# Patient Record
Sex: FEMALE | Race: WHITE | NOT HISPANIC OR LATINO | Employment: PART TIME | URBAN - METROPOLITAN AREA
[De-identification: names, ages, dates, MRNs, and addresses within clinical notes are randomized per-mention and may not be internally consistent; named-entity substitution may affect disease eponyms.]

---

## 2018-03-05 ENCOUNTER — OFFICE VISIT (OUTPATIENT)
Dept: URGENT CARE | Facility: PHYSICIAN GROUP | Age: 58
End: 2018-03-05

## 2018-03-05 VITALS
DIASTOLIC BLOOD PRESSURE: 74 MMHG | OXYGEN SATURATION: 97 % | BODY MASS INDEX: 45.99 KG/M2 | WEIGHT: 293 LBS | HEIGHT: 67 IN | SYSTOLIC BLOOD PRESSURE: 116 MMHG | TEMPERATURE: 98.4 F | HEART RATE: 98 BPM

## 2018-03-05 DIAGNOSIS — B37.9 ANTIBIOTIC-INDUCED YEAST INFECTION: ICD-10-CM

## 2018-03-05 DIAGNOSIS — T36.95XA ANTIBIOTIC-INDUCED YEAST INFECTION: ICD-10-CM

## 2018-03-05 DIAGNOSIS — J01.40 ACUTE PANSINUSITIS, RECURRENCE NOT SPECIFIED: ICD-10-CM

## 2018-03-05 PROCEDURE — 99204 OFFICE O/P NEW MOD 45 MIN: CPT | Performed by: NURSE PRACTITIONER

## 2018-03-05 RX ORDER — CEFDINIR 300 MG/1
300 CAPSULE ORAL 2 TIMES DAILY
Qty: 20 CAP | Refills: 0 | Status: SHIPPED | OUTPATIENT
Start: 2018-03-05 | End: 2020-10-20

## 2018-03-05 RX ORDER — FLUCONAZOLE 150 MG/1
150 TABLET ORAL ONCE
Qty: 2 TAB | Refills: 0 | Status: SHIPPED | OUTPATIENT
Start: 2018-03-05 | End: 2018-03-05

## 2018-03-06 NOTE — PROGRESS NOTES
Subjective:      Nikki Shaffer is a 57 y.o. female who presents with Sinus Problem (congestion and pain, green mucus )            HPI  Left side sinus facial pressure x 10 days, had cough and sore throat but has improved. Denies fever. Nasal congestion with green nasal d/c. Nausea d/t swallowing mucus. No SOB, chest tightness, wheeze. Mucinex. Saline nasal flush 2x/day. Flonase.     PMH:  has a past medical history of Allergic rhinitis; Depression; and Reflux.  MEDS:   Current Outpatient Prescriptions:   •  Oxycodone-Acetaminophen (PERCOCET)  MG TABS, Take 1 Tab by mouth every 8 hours as needed for Severe Pain. Fill after 01/01/2012, Disp: 60 Each, Rfl: 0  •  duloxetine (CYMBALTA) 60 MG CPEP, Take 1 Cap by mouth 2 times a day., Disp: 180 Cap, Rfl: 3  •  cyclobenzaprine (FLEXERIL) 10 MG TABS, Take 1 Tab by mouth 3 times a day as needed for Mild Pain., Disp: 90 Each, Rfl: 2  •  pramipexole (MIRAPEX) 1 MG TABS, Take 1 Tab by mouth 3 times a day., Disp: 270 Each, Rfl: 3  •  trazodone (DESYREL) 100 MG TABS, Take 1.5 Tabs by mouth 2 times a day., Disp: 180 Each, Rfl: 3  •  metformin SR (GLUCOPHAGE XR) 500 MG TB24, Take 1 Tab by mouth every day., Disp: 90 Each, Rfl: 3  •  Magnesium 100 MG CAPS, Take 400 mg by mouth every day., Disp: , Rfl:   •  simvastatin (ZOCOR) 20 MG TABS, Take 1 Tab by mouth every evening., Disp: 90 Each, Rfl: 11  •  omeprazole (PRILOSEC OTC) 20 MG tablet, Take 1 Tab by mouth every day., Disp: 90 Tab, Rfl: 3  •  vitamin D (CHOLECALCIFEROL) 1000 UNIT TABS, Take 1,000 Units by mouth every day., Disp: , Rfl:   •  Ibuprofen (ADVIL) 200 MG CAPS, Take 800 mg by mouth every day., Disp: , Rfl:   ALLERGIES:   Allergies   Allergen Reactions   • Codeine Swelling   • Vicodin [Hydrocodone-Acetaminophen] Vomiting     SURGHX:   Past Surgical History:   Procedure Laterality Date   • APPENDECTOMY       SOCHX:  reports that she has been smoking.  She has never used smokeless tobacco. She reports that she drinks  "alcohol. She reports that she does not use drugs.  FH: Family history was reviewed, no pertinent findings to report    Review of Systems   Constitutional: Negative for chills, fever and malaise/fatigue.   HENT: Positive for congestion. Negative for ear pain, sinus pain and sore throat.    Eyes: Negative for discharge and redness.   Respiratory: Negative for cough, sputum production, shortness of breath and wheezing.    Gastrointestinal: Positive for nausea. Negative for abdominal pain, constipation, diarrhea and vomiting.   Musculoskeletal: Negative for myalgias.   Neurological: Negative for dizziness, weakness and headaches.   All other systems reviewed and are negative.         Objective:     /74   Pulse 98   Temp 36.9 °C (98.4 °F)   Ht 1.702 m (5' 7\")   Wt (!) 147.9 kg (326 lb)   LMP 02/09/2010   SpO2 97%   BMI 51.06 kg/m²      Physical Exam   Constitutional: She is oriented to person, place, and time. Vital signs are normal. She appears well-developed and well-nourished. She is active and cooperative.  Non-toxic appearance. She does not have a sickly appearance. She appears ill. No distress.   HENT:   Head: Normocephalic.   Right Ear: External ear and ear canal normal. Tympanic membrane is injected.   Left Ear: External ear and ear canal normal. Tympanic membrane is injected.   Nose: Mucosal edema, rhinorrhea and sinus tenderness present. Right sinus exhibits maxillary sinus tenderness and frontal sinus tenderness. Left sinus exhibits maxillary sinus tenderness and frontal sinus tenderness.   Mouth/Throat: Uvula is midline. Mucous membranes are dry. No uvula swelling. No posterior oropharyngeal edema or posterior oropharyngeal erythema.   Eyes: Conjunctivae and EOM are normal. Pupils are equal, round, and reactive to light.   Neck: Normal range of motion. Neck supple.   Cardiovascular: Normal rate and regular rhythm.    Pulmonary/Chest: Effort normal and breath sounds normal. No accessory muscle " usage. No respiratory distress. She has no wheezes. She has no rales.   Musculoskeletal: Normal range of motion.   Lymphadenopathy:     She has no cervical adenopathy.   Neurological: She is alert and oriented to person, place, and time.   Skin: Skin is warm and dry. She is not diaphoretic.   Vitals reviewed.              Assessment/Plan:     1. Acute pansinusitis, recurrence not specified    - cefdinir (OMNICEF) 300 MG Cap; Take 1 Cap by mouth 2 times a day.  Dispense: 20 Cap; Refill: 0    2. Antibiotic-induced yeast infection  - fluconazole (DIFLUCAN) 150 MG tablet; Take 1 Tab by mouth Once for 1 dose. May repeat in 3 days if no improvement  Dispense: 2 Tab; Refill: 0    Increase water intake  Get rest  May use Ibuprofen/Tylenol prn for any fever, body aches or throat pain  May use saline nasal spray for nasal congestion  May use Flonase for allergen nasal congestion  May gargle with salt water prn for throat discomfort  May drink smoothies for nutrition if too painful to swallow solid foods  Monitor for productive cough, SOB and chest pain/tightness- need re-evaluation

## 2018-03-07 ASSESSMENT — ENCOUNTER SYMPTOMS
CONSTIPATION: 0
COUGH: 0
WEAKNESS: 0
VOMITING: 0
DIZZINESS: 0
WHEEZING: 0
DIARRHEA: 0
ABDOMINAL PAIN: 0
MYALGIAS: 0
EYE DISCHARGE: 0
SORE THROAT: 0
SHORTNESS OF BREATH: 0
CHILLS: 0
SINUS PAIN: 0
FEVER: 0
SPUTUM PRODUCTION: 0
NAUSEA: 1
EYE REDNESS: 0
HEADACHES: 0

## 2020-09-02 ENCOUNTER — HOSPITAL ENCOUNTER (EMERGENCY)
Dept: HOSPITAL 8 - ED | Age: 60
Discharge: HOME | End: 2020-09-02
Payer: COMMERCIAL

## 2020-09-02 VITALS — SYSTOLIC BLOOD PRESSURE: 128 MMHG | DIASTOLIC BLOOD PRESSURE: 77 MMHG

## 2020-09-02 VITALS — WEIGHT: 293 LBS | BODY MASS INDEX: 44.41 KG/M2 | HEIGHT: 68 IN

## 2020-09-02 DIAGNOSIS — M79.10: ICD-10-CM

## 2020-09-02 DIAGNOSIS — G89.29: Primary | ICD-10-CM

## 2020-09-02 DIAGNOSIS — M54.32: ICD-10-CM

## 2020-09-02 DIAGNOSIS — M54.5: ICD-10-CM

## 2020-09-02 DIAGNOSIS — M51.36: ICD-10-CM

## 2020-09-02 DIAGNOSIS — Z79.899: ICD-10-CM

## 2020-09-02 PROCEDURE — 96375 TX/PRO/DX INJ NEW DRUG ADDON: CPT

## 2020-09-02 PROCEDURE — 96374 THER/PROPH/DIAG INJ IV PUSH: CPT

## 2020-09-02 PROCEDURE — 99284 EMERGENCY DEPT VISIT MOD MDM: CPT

## 2020-09-02 NOTE — NUR
PIV PLACED TO RT BREAST, FLUSHES EASILY WITH GOOD BLOOD RETURN. PT TOLERATED 
WELL. PT MEDICATED PER EMAR, TOLERATED WELL. BP  AND SPO2 MONITORS IN PLACE. PT 
REPORTS PAIN DECREASED FROM 8/10 TO 6/10. EDPA [facility] AT BEDSIDE AT THIS TIME, PT 
CONVERSING WITH EDPA REGARDING POC.

## 2020-10-20 ENCOUNTER — HOSPITAL ENCOUNTER (OUTPATIENT)
Facility: MEDICAL CENTER | Age: 60
End: 2020-10-22
Attending: EMERGENCY MEDICINE | Admitting: INTERNAL MEDICINE
Payer: COMMERCIAL

## 2020-10-20 DIAGNOSIS — M54.50 ACUTE EXACERBATION OF CHRONIC LOW BACK PAIN: ICD-10-CM

## 2020-10-20 DIAGNOSIS — M54.59 INTRACTABLE LOW BACK PAIN: ICD-10-CM

## 2020-10-20 DIAGNOSIS — G89.29 ACUTE EXACERBATION OF CHRONIC LOW BACK PAIN: ICD-10-CM

## 2020-10-20 DIAGNOSIS — R26.2 UNABLE TO AMBULATE: ICD-10-CM

## 2020-10-20 DIAGNOSIS — M54.17 LUMBOSACRAL RADICULOPATHY: ICD-10-CM

## 2020-10-20 PROBLEM — Z72.0 TOBACCO ABUSE: Status: ACTIVE | Noted: 2020-10-20

## 2020-10-20 PROBLEM — E11.9 DM (DIABETES MELLITUS) (HCC): Status: ACTIVE | Noted: 2020-10-20

## 2020-10-20 LAB — GLUCOSE BLD-MCNC: 132 MG/DL (ref 65–99)

## 2020-10-20 PROCEDURE — 96375 TX/PRO/DX INJ NEW DRUG ADDON: CPT

## 2020-10-20 PROCEDURE — 96372 THER/PROPH/DIAG INJ SC/IM: CPT | Mod: XU

## 2020-10-20 PROCEDURE — 51798 US URINE CAPACITY MEASURE: CPT

## 2020-10-20 PROCEDURE — 99285 EMERGENCY DEPT VISIT HI MDM: CPT

## 2020-10-20 PROCEDURE — A9270 NON-COVERED ITEM OR SERVICE: HCPCS | Performed by: STUDENT IN AN ORGANIZED HEALTH CARE EDUCATION/TRAINING PROGRAM

## 2020-10-20 PROCEDURE — 96374 THER/PROPH/DIAG INJ IV PUSH: CPT

## 2020-10-20 PROCEDURE — 700111 HCHG RX REV CODE 636 W/ 250 OVERRIDE (IP): Performed by: STUDENT IN AN ORGANIZED HEALTH CARE EDUCATION/TRAINING PROGRAM

## 2020-10-20 PROCEDURE — 700102 HCHG RX REV CODE 250 W/ 637 OVERRIDE(OP): Performed by: STUDENT IN AN ORGANIZED HEALTH CARE EDUCATION/TRAINING PROGRAM

## 2020-10-20 PROCEDURE — G0378 HOSPITAL OBSERVATION PER HR: HCPCS

## 2020-10-20 PROCEDURE — 82962 GLUCOSE BLOOD TEST: CPT

## 2020-10-20 PROCEDURE — 700111 HCHG RX REV CODE 636 W/ 250 OVERRIDE (IP): Performed by: EMERGENCY MEDICINE

## 2020-10-20 PROCEDURE — 99220 PR INITIAL OBSERVATION CARE,LEVL III: CPT | Mod: GC | Performed by: INTERNAL MEDICINE

## 2020-10-20 RX ORDER — INSULIN GLARGINE 100 [IU]/ML
0.2 INJECTION, SOLUTION SUBCUTANEOUS EVERY EVENING
Status: DISCONTINUED | OUTPATIENT
Start: 2020-10-20 | End: 2020-10-20

## 2020-10-20 RX ORDER — SIMVASTATIN 20 MG
20 TABLET ORAL EVERY EVENING
Status: DISCONTINUED | OUTPATIENT
Start: 2020-10-20 | End: 2020-10-22 | Stop reason: HOSPADM

## 2020-10-20 RX ORDER — MORPHINE SULFATE 10 MG/ML
10 INJECTION, SOLUTION INTRAMUSCULAR; INTRAVENOUS ONCE
Status: DISCONTINUED | OUTPATIENT
Start: 2020-10-20 | End: 2020-10-21

## 2020-10-20 RX ORDER — OXYCODONE HYDROCHLORIDE AND ACETAMINOPHEN 5; 325 MG/1; MG/1
1 TABLET ORAL EVERY 4 HOURS PRN
COMMUNITY

## 2020-10-20 RX ORDER — LORAZEPAM 0.5 MG/1
1 TABLET ORAL ONCE
Status: DISCONTINUED | OUTPATIENT
Start: 2020-10-20 | End: 2020-10-21

## 2020-10-20 RX ORDER — AMOXICILLIN 250 MG
2 CAPSULE ORAL 2 TIMES DAILY
Status: DISCONTINUED | OUTPATIENT
Start: 2020-10-20 | End: 2020-10-22 | Stop reason: HOSPADM

## 2020-10-20 RX ORDER — DEXTROSE MONOHYDRATE 25 G/50ML
50 INJECTION, SOLUTION INTRAVENOUS
Status: DISCONTINUED | OUTPATIENT
Start: 2020-10-20 | End: 2020-10-22

## 2020-10-20 RX ORDER — KETOROLAC TROMETHAMINE 30 MG/ML
30 INJECTION, SOLUTION INTRAMUSCULAR; INTRAVENOUS ONCE
Status: COMPLETED | OUTPATIENT
Start: 2020-10-20 | End: 2020-10-20

## 2020-10-20 RX ORDER — POLYETHYLENE GLYCOL 3350 17 G/17G
1 POWDER, FOR SOLUTION ORAL
Status: DISCONTINUED | OUTPATIENT
Start: 2020-10-20 | End: 2020-10-22 | Stop reason: HOSPADM

## 2020-10-20 RX ORDER — NICOTINE 21 MG/24HR
14 PATCH, TRANSDERMAL 24 HOURS TRANSDERMAL
Status: DISCONTINUED | OUTPATIENT
Start: 2020-10-20 | End: 2020-10-22 | Stop reason: HOSPADM

## 2020-10-20 RX ORDER — BISACODYL 10 MG
10 SUPPOSITORY, RECTAL RECTAL
Status: DISCONTINUED | OUTPATIENT
Start: 2020-10-20 | End: 2020-10-22 | Stop reason: HOSPADM

## 2020-10-20 RX ORDER — TIZANIDINE 4 MG/1
4 TABLET ORAL EVERY 6 HOURS
COMMUNITY

## 2020-10-20 RX ORDER — TIZANIDINE 4 MG/1
4 TABLET ORAL EVERY 6 HOURS
Status: DISCONTINUED | OUTPATIENT
Start: 2020-10-20 | End: 2020-10-22 | Stop reason: HOSPADM

## 2020-10-20 RX ORDER — DEXAMETHASONE SODIUM PHOSPHATE 4 MG/ML
12 INJECTION, SOLUTION INTRA-ARTICULAR; INTRALESIONAL; INTRAMUSCULAR; INTRAVENOUS; SOFT TISSUE ONCE
Status: COMPLETED | OUTPATIENT
Start: 2020-10-20 | End: 2020-10-20

## 2020-10-20 RX ORDER — OXYCODONE HYDROCHLORIDE AND ACETAMINOPHEN 5; 325 MG/1; MG/1
1 TABLET ORAL EVERY 4 HOURS PRN
Status: DISCONTINUED | OUTPATIENT
Start: 2020-10-20 | End: 2020-10-22 | Stop reason: HOSPADM

## 2020-10-20 RX ORDER — DIAZEPAM 5 MG/ML
5 INJECTION, SOLUTION INTRAMUSCULAR; INTRAVENOUS ONCE
Status: COMPLETED | OUTPATIENT
Start: 2020-10-20 | End: 2020-10-20

## 2020-10-20 RX ORDER — HEPARIN SODIUM 5000 [USP'U]/ML
5000 INJECTION, SOLUTION INTRAVENOUS; SUBCUTANEOUS EVERY 8 HOURS
Status: DISCONTINUED | OUTPATIENT
Start: 2020-10-20 | End: 2020-10-22 | Stop reason: HOSPADM

## 2020-10-20 RX ADMIN — DIAZEPAM 5 MG: 5 INJECTION, SOLUTION INTRAMUSCULAR; INTRAVENOUS at 12:06

## 2020-10-20 RX ADMIN — KETOROLAC TROMETHAMINE 30 MG: 30 INJECTION, SOLUTION INTRAMUSCULAR at 11:55

## 2020-10-20 RX ADMIN — OXYCODONE HYDROCHLORIDE AND ACETAMINOPHEN 1 TABLET: 5; 325 TABLET ORAL at 22:28

## 2020-10-20 RX ADMIN — HEPARIN SODIUM 5000 UNITS: 5000 INJECTION, SOLUTION INTRAVENOUS; SUBCUTANEOUS at 22:28

## 2020-10-20 RX ADMIN — DEXAMETHASONE SODIUM PHOSPHATE 12 MG: 4 INJECTION, SOLUTION INTRA-ARTICULAR; INTRALESIONAL; INTRAMUSCULAR; INTRAVENOUS; SOFT TISSUE at 11:55

## 2020-10-20 ASSESSMENT — ENCOUNTER SYMPTOMS
CONSTIPATION: 0
SORE THROAT: 0
FALLS: 0
WHEEZING: 0
BLURRED VISION: 0
WEAKNESS: 1
LOSS OF CONSCIOUSNESS: 0
HEADACHES: 0
DEPRESSION: 0
SPEECH CHANGE: 0
VOMITING: 0
MYALGIAS: 0
FEVER: 0
NAUSEA: 0
HEMOPTYSIS: 0
SHORTNESS OF BREATH: 0
CLAUDICATION: 0
ABDOMINAL PAIN: 0
DIARRHEA: 0
PALPITATIONS: 0
FOCAL WEAKNESS: 0
TINGLING: 1
SENSORY CHANGE: 0
COUGH: 0
TREMORS: 0
SEIZURES: 0
WEIGHT LOSS: 0
DOUBLE VISION: 0
BACK PAIN: 1
CHILLS: 0
DIZZINESS: 0
NECK PAIN: 0

## 2020-10-20 ASSESSMENT — LIFESTYLE VARIABLES
TOTAL SCORE: 0
CONSUMPTION TOTAL: NEGATIVE
HAVE YOU EVER FELT YOU SHOULD CUT DOWN ON YOUR DRINKING: NO
TOTAL SCORE: 0
HAVE PEOPLE ANNOYED YOU BY CRITICIZING YOUR DRINKING: NO
DO YOU DRINK ALCOHOL: NO
SUBSTANCE_ABUSE: 0
AVERAGE NUMBER OF DAYS PER WEEK YOU HAVE A DRINK CONTAINING ALCOHOL: 0
EVER HAD A DRINK FIRST THING IN THE MORNING TO STEADY YOUR NERVES TO GET RID OF A HANGOVER: NO
DOES PATIENT WANT TO STOP DRINKING: NO
ALCOHOL_USE: YES
ON A TYPICAL DAY WHEN YOU DRINK ALCOHOL HOW MANY DRINKS DO YOU HAVE: 0
DOES PATIENT WANT TO STOP DRINKING: NO
EVER FELT BAD OR GUILTY ABOUT YOUR DRINKING: NO
HOW MANY TIMES IN THE PAST YEAR HAVE YOU HAD 5 OR MORE DRINKS IN A DAY: 0
TOTAL SCORE: 0

## 2020-10-20 ASSESSMENT — COGNITIVE AND FUNCTIONAL STATUS - GENERAL
PERSONAL GROOMING: A LOT
MOVING TO AND FROM BED TO CHAIR: A LOT
TURNING FROM BACK TO SIDE WHILE IN FLAT BAD: A LOT
CLIMB 3 TO 5 STEPS WITH RAILING: TOTAL
WALKING IN HOSPITAL ROOM: A LOT
TOILETING: A LOT
STANDING UP FROM CHAIR USING ARMS: TOTAL
DRESSING REGULAR UPPER BODY CLOTHING: A LOT
MOBILITY SCORE: 10
EATING MEALS: A LITTLE
SUGGESTED CMS G CODE MODIFIER MOBILITY: CL
HELP NEEDED FOR BATHING: A LOT
DAILY ACTIVITIY SCORE: 13
MOVING FROM LYING ON BACK TO SITTING ON SIDE OF FLAT BED: A LOT
SUGGESTED CMS G CODE MODIFIER DAILY ACTIVITY: CL
DRESSING REGULAR LOWER BODY CLOTHING: A LOT

## 2020-10-20 ASSESSMENT — PAIN DESCRIPTION - PAIN TYPE
TYPE: ACUTE PAIN

## 2020-10-20 ASSESSMENT — PATIENT HEALTH QUESTIONNAIRE - PHQ9
SUM OF ALL RESPONSES TO PHQ9 QUESTIONS 1 AND 2: 0
2. FEELING DOWN, DEPRESSED, IRRITABLE, OR HOPELESS: NOT AT ALL
1. LITTLE INTEREST OR PLEASURE IN DOING THINGS: NOT AT ALL

## 2020-10-20 NOTE — PROGRESS NOTES
Triage officer:  Ms. Shaffer presents with back pain.  MRI was done at [facility] 6 weeks ago and reportedly were negative.  She has been seen by pain management.   She has been given toradol, steroids, valium in the ER but is unable to walk due to pain.  Due to worsening of her pain, Dr. Pablo has ordered an MRI.  Dr. Sen, [facility] senior resident, has graciously agreed to admit for further work up.

## 2020-10-20 NOTE — ED NOTES
Med rec updated and complete. Allergies reviewed. No antibiotic use  In last 14 days  At home.  Placed call to listed home pharmacy. No recent prescriptions filled.  Home pharmacy [facility].

## 2020-10-20 NOTE — ED PROVIDER NOTES
"ED Provider Note    CHIEF COMPLAINT  Chief Complaint   Patient presents with   • Back Pain     lumbar back pain since 0330 today       HPI  Nikki Shaffer is a 60 y.o. female who presents to the emergency department through triage in wheelchair for low back pain.  Patient has had low back pain previously, and now again for the last 6 weeks.  She states it was initially feeling better with physical therapy, Percocet, tizanidine.  She states she had an MRI complete which showed spondylolisthesis.  She is followed by [facility], duran that would have been epidural injection.  Patient states her pain had increased over the weekend and she was out of her tizanidine.  Pain radiates across the low back.  Worse with movement.  Patient states she was unable to stand on her own today due to pain, when EMS assisted her to her feet, she felt as though \"my legs would not work because the pain was so bad.\"  She took it yesterday when she got the prescription filled along with Percocet, but is continued to have low back pain and spasm.  Intermittent paresthesias to the left upper leg.  No incontinence or urinary retention.  No abdominal pain.  No fever chills.  Denies new injury, trauma or strain pattern.    No IV drug use.  No fever chills.  No nausea or vomiting.    REVIEW OF SYSTEMS  See HPI for further details. All other systems are negative.     PAST MEDICAL HISTORY   has a past medical history of Allergic rhinitis, Depression, and Reflux.    SOCIAL HISTORY  Social History     Tobacco Use   • Smoking status: Current Every Day Smoker   • Smokeless tobacco: Never Used   • Tobacco comment: 1/2 ppd   Substance and Sexual Activity   • Alcohol use: Yes     Comment: occ   • Drug use: No   • Sexual activity: Not on file       SURGICAL HISTORY   has a past surgical history that includes appendectomy.    CURRENT MEDICATIONS  Home Medications    **Home medications have not yet been reviewed for this encounter**         ALLERGIES  Allergies " "  Allergen Reactions   • Codeine Swelling   • Vicodin [Hydrocodone-Acetaminophen] Vomiting       PHYSICAL EXAM  VITAL SIGNS: /61   Pulse 68   Temp 36.6 °C (97.9 °F) (Temporal)   Resp 18   Ht 1.727 m (5' 8\")   Wt (!) 155.6 kg (343 lb)   LMP 02/09/2010   SpO2 95%   BMI 52.15 kg/m²   Pulse ox interpretation: I interpret this pulse ox as normal.  Constitutional: Alert in no apparent distress.  Morbidly obese.  HENT: Normocephalic, atraumatic. Bilateral external ears normal, Nose normal. Moist mucous membranes.    Eyes: Pupils are equal and reactive, Conjunctiva normal.   Neck: Normal range of motion, Supple  Lymphatic: No lymphadenopathy noted.   Cardiovascular: Normal peripheral perfusion.  Thorax & Lungs: Nonlabored respirations.  Abdomen: Obese, soft, non-distended, non-tender to palpation. No palpable or pulsatile masses.   Skin: Warm, Dry, No erythema, No rash.   Musculoskeletal: Good range of motion in all major joints. No major deformities noted.  Pain with palpation midline and laterally lower lumbar spine, sacral region.  No cutaneous changes.  Neurologic: Alert and orient x4.  5 out of 5 strength bilateral upper extremities with proximal distal muscle groups.  Lower extremities, patient is in a wheelchair and unable to move from this at that time.  Pain with hip flexion bilaterally, pain limits most any flexion on the left, is able to perform some on the right.  Less pain with passive flexion.  Lower extremity flexion extension, dorsiflexion and plantar flexion intact, 5 out of 5.  Sensation intact light touch distally, no saddle anesthesia.  Psychiatric: Affect normal, Judgment normal, Mood normal.       DIAGNOSTIC STUDIES / PROCEDURES      RADIOLOGY  MR-LUMBAR SPINE-W/O    (Results Pending)       COURSE & MEDICAL DECISION MAKING  Nursing notes and vital signs were reviewed. (See chart for details)  The patients records were reviewed, history was obtained from the patient;     ED evaluation for " low back pain is unrevealing, suspect exacerbation of her ongoing pain the last 6 weeks.  Lumbosacral radiculopathy, however now patient is unable to ambulate.  This is new from her earlier symptomatology.  Chronic left leg radiculopathy.  She is neurologically intact and nonfocal at this time but has significant discomfort and near inability with straight leg raise on the left.  She is able to stand after pain control, Toradol/Decadron/Valium, in the emergency department however patient unable to ambulate from that point even with use of walker.  Patient, although bears weight, states her legs feel weak under her and she is afraid to fall.  I do think some ongoing pain is contributing to this.  However given the acute change from her what seemed to be improving status over the last 6 weeks, MRI will be ordered.  Since she is unable to ambulate, she is not safe for discharge will be hospitalized till further intervention, pain control, PT and neurosurgical consult if indicated.  Patient has no history to suggest cauda equina, epidural abscess or spinal cord compression syndrome requiring acute intervention or emergent MRI.    2:18 PM Dr. Celaya is aware the patient agreeable to consultation.  We will follow results of the MRI.    FINAL IMPRESSION  (M54.5) Intractable low back pain  (M54.17) Lumbosacral radiculopathy  (R26.2) Unable to ambulate      Electronically signed by: Lolly Pablo D.O., 10/20/2020 12:27 PM      This dictation was created using voice recognition software. The accuracy of the dictation is limited to the abilities of the software. I expect there may be some errors of grammar and possibly content. The nursing notes were reviewed and certain aspects of this information were incorporated into this note.

## 2020-10-20 NOTE — ED NOTES
"Pt taken by wheelchair to restroom. Attempted to ambulate patient to toilet to urinate. Pt unable to stand up unassisted with use of cane, walker and wheelchair. Pt states her legs feel unsteady and \"numb\". Pt able to lift herself slightly, then sits back down. Pt back to room. MD Pablo notified. Pt offered Purewick for urination, pt refused. Pt states \"I don't need to go bad right now\". Pt has call light in reach if she changes her mind.   "

## 2020-10-20 NOTE — ED NOTES
MRI states pt unable to fit in MRI d/t pt size. Hospitalist at bedside notified. MRI screening form not completed at this time. Pt provided pillow per request. Pt denies other needs/concerns at this time.
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
## 2020-10-20 NOTE — ED NOTES
Received report from MARITZA Murillo. Assumed care of patient. Patient sitting up in wheelchair, A&OX4. RR even and unlabored. Pt reports some pain relief, 7/10, currently with medications given. Call light in reach. No needs at this time.

## 2020-10-20 NOTE — ED TRIAGE NOTES
"Nikki Law  Chief Complaint   Patient presents with   • Back Pain     lumbar back pain since 0330 today     [facility] Pt c/o acute on chronic lower back pain since 0330 today. Pt took Percocet 5-325 and Tizanidine 4 mg at home without relief. Pt reports she has been without her pain medication for the prior 4-5 days, just got refilled yesterday. Pt reports she has been unable to ambulate this morning d/t pain. Pt denies injury or trauma for acute episode. Denies loss of bowel/bladder control. Pt has been seen by [facility] and already had MRI completed.     Patient informed of triage process and to inform staff of any changes/worsening symptoms. Pt verbalized understanding. Pt denies concerns. Pt back to waiting room.     /109   Pulse 100   Temp 36.6 °C (97.9 °F) (Temporal)   Resp 18   Ht 1.727 m (5' 8\")   Wt (!) 155.6 kg (343 lb)   SpO2 98%   "

## 2020-10-21 ENCOUNTER — APPOINTMENT (OUTPATIENT)
Dept: RADIOLOGY | Facility: MEDICAL CENTER | Age: 60
End: 2020-10-21
Attending: EMERGENCY MEDICINE
Payer: COMMERCIAL

## 2020-10-21 ENCOUNTER — APPOINTMENT (OUTPATIENT)
Dept: RADIOLOGY | Facility: MEDICAL CENTER | Age: 60
End: 2020-10-21
Attending: STUDENT IN AN ORGANIZED HEALTH CARE EDUCATION/TRAINING PROGRAM
Payer: COMMERCIAL

## 2020-10-21 LAB
25(OH)D3 SERPL-MCNC: 32 NG/ML (ref 30–100)
ALBUMIN SERPL BCP-MCNC: 3.6 G/DL (ref 3.2–4.9)
ALBUMIN/GLOB SERPL: 1.2 G/DL
ALP SERPL-CCNC: 101 U/L (ref 30–99)
ALT SERPL-CCNC: 18 U/L (ref 2–50)
AMPHET UR QL SCN: NEGATIVE
ANION GAP SERPL CALC-SCNC: 8 MMOL/L (ref 7–16)
AST SERPL-CCNC: 16 U/L (ref 12–45)
BARBITURATES UR QL SCN: NEGATIVE
BASOPHILS # BLD AUTO: 0.4 % (ref 0–1.8)
BASOPHILS # BLD: 0.04 K/UL (ref 0–0.12)
BENZODIAZ UR QL SCN: NEGATIVE
BILIRUB SERPL-MCNC: 0.4 MG/DL (ref 0.1–1.5)
BUN SERPL-MCNC: 13 MG/DL (ref 8–22)
BZE UR QL SCN: NEGATIVE
CALCIUM SERPL-MCNC: 9.2 MG/DL (ref 8.5–10.5)
CANNABINOIDS UR QL SCN: POSITIVE
CHLORIDE SERPL-SCNC: 102 MMOL/L (ref 96–112)
CO2 SERPL-SCNC: 26 MMOL/L (ref 20–33)
CREAT SERPL-MCNC: 0.67 MG/DL (ref 0.5–1.4)
CRP SERPL HS-MCNC: 2.62 MG/DL (ref 0–0.75)
EOSINOPHIL # BLD AUTO: 0.02 K/UL (ref 0–0.51)
EOSINOPHIL NFR BLD: 0.2 % (ref 0–6.9)
ERYTHROCYTE [DISTWIDTH] IN BLOOD BY AUTOMATED COUNT: 46.9 FL (ref 35.9–50)
ERYTHROCYTE [SEDIMENTATION RATE] IN BLOOD BY WESTERGREN METHOD: 29 MM/HOUR (ref 0–30)
EST. AVERAGE GLUCOSE BLD GHB EST-MCNC: 114 MG/DL
FERRITIN SERPL-MCNC: 99.5 NG/ML (ref 10–291)
GLOBULIN SER CALC-MCNC: 2.9 G/DL (ref 1.9–3.5)
GLUCOSE BLD-MCNC: 102 MG/DL (ref 65–99)
GLUCOSE BLD-MCNC: 121 MG/DL (ref 65–99)
GLUCOSE BLD-MCNC: 96 MG/DL (ref 65–99)
GLUCOSE SERPL-MCNC: 134 MG/DL (ref 65–99)
HBA1C MFR BLD: 5.6 % (ref 0–5.6)
HCT VFR BLD AUTO: 40.1 % (ref 37–47)
HGB BLD-MCNC: 13 G/DL (ref 12–16)
IMM GRANULOCYTES # BLD AUTO: 0.03 K/UL (ref 0–0.11)
IMM GRANULOCYTES NFR BLD AUTO: 0.3 % (ref 0–0.9)
INR PPP: 0.94 (ref 0.87–1.13)
LYMPHOCYTES # BLD AUTO: 1.88 K/UL (ref 1–4.8)
LYMPHOCYTES NFR BLD: 20.9 % (ref 22–41)
MAGNESIUM SERPL-MCNC: 2 MG/DL (ref 1.5–2.5)
MCH RBC QN AUTO: 28.8 PG (ref 27–33)
MCHC RBC AUTO-ENTMCNC: 32.4 G/DL (ref 33.6–35)
MCV RBC AUTO: 88.7 FL (ref 81.4–97.8)
METHADONE UR QL SCN: NEGATIVE
MONOCYTES # BLD AUTO: 0.46 K/UL (ref 0–0.85)
MONOCYTES NFR BLD AUTO: 5.1 % (ref 0–13.4)
NEUTROPHILS # BLD AUTO: 6.57 K/UL (ref 2–7.15)
NEUTROPHILS NFR BLD: 73.1 % (ref 44–72)
NRBC # BLD AUTO: 0 K/UL
NRBC BLD-RTO: 0 /100 WBC
OPIATES UR QL SCN: NEGATIVE
OXYCODONE UR QL SCN: POSITIVE
PCP UR QL SCN: NEGATIVE
PHOSPHATE SERPL-MCNC: 2.6 MG/DL (ref 2.5–4.5)
PLATELET # BLD AUTO: 206 K/UL (ref 164–446)
PMV BLD AUTO: 10.1 FL (ref 9–12.9)
POTASSIUM SERPL-SCNC: 3.8 MMOL/L (ref 3.6–5.5)
PROPOXYPH UR QL SCN: NEGATIVE
PROT SERPL-MCNC: 6.5 G/DL (ref 6–8.2)
PROTHROMBIN TIME: 12.9 SEC (ref 12–14.6)
RBC # BLD AUTO: 4.52 M/UL (ref 4.2–5.4)
SODIUM SERPL-SCNC: 136 MMOL/L (ref 135–145)
T4 FREE SERPL-MCNC: 1.1 NG/DL (ref 0.93–1.7)
VIT B12 SERPL-MCNC: 617 PG/ML (ref 211–911)
WBC # BLD AUTO: 9 K/UL (ref 4.8–10.8)

## 2020-10-21 PROCEDURE — 93970 EXTREMITY STUDY: CPT

## 2020-10-21 PROCEDURE — 85025 COMPLETE CBC W/AUTO DIFF WBC: CPT

## 2020-10-21 PROCEDURE — 86140 C-REACTIVE PROTEIN: CPT

## 2020-10-21 PROCEDURE — A9270 NON-COVERED ITEM OR SERVICE: HCPCS | Performed by: STUDENT IN AN ORGANIZED HEALTH CARE EDUCATION/TRAINING PROGRAM

## 2020-10-21 PROCEDURE — 80307 DRUG TEST PRSMV CHEM ANLYZR: CPT

## 2020-10-21 PROCEDURE — 93970 EXTREMITY STUDY: CPT | Mod: 26 | Performed by: INTERNAL MEDICINE

## 2020-10-21 PROCEDURE — 82607 VITAMIN B-12: CPT

## 2020-10-21 PROCEDURE — 97535 SELF CARE MNGMENT TRAINING: CPT

## 2020-10-21 PROCEDURE — 84100 ASSAY OF PHOSPHORUS: CPT

## 2020-10-21 PROCEDURE — 96372 THER/PROPH/DIAG INJ SC/IM: CPT

## 2020-10-21 PROCEDURE — 83036 HEMOGLOBIN GLYCOSYLATED A1C: CPT

## 2020-10-21 PROCEDURE — 36415 COLL VENOUS BLD VENIPUNCTURE: CPT

## 2020-10-21 PROCEDURE — G0378 HOSPITAL OBSERVATION PER HR: HCPCS

## 2020-10-21 PROCEDURE — 85610 PROTHROMBIN TIME: CPT

## 2020-10-21 PROCEDURE — 82728 ASSAY OF FERRITIN: CPT

## 2020-10-21 PROCEDURE — 700102 HCHG RX REV CODE 250 W/ 637 OVERRIDE(OP): Performed by: STUDENT IN AN ORGANIZED HEALTH CARE EDUCATION/TRAINING PROGRAM

## 2020-10-21 PROCEDURE — 80053 COMPREHEN METABOLIC PANEL: CPT

## 2020-10-21 PROCEDURE — 83735 ASSAY OF MAGNESIUM: CPT

## 2020-10-21 PROCEDURE — 97165 OT EVAL LOW COMPLEX 30 MIN: CPT

## 2020-10-21 PROCEDURE — 85652 RBC SED RATE AUTOMATED: CPT

## 2020-10-21 PROCEDURE — 97162 PT EVAL MOD COMPLEX 30 MIN: CPT

## 2020-10-21 PROCEDURE — 84439 ASSAY OF FREE THYROXINE: CPT

## 2020-10-21 PROCEDURE — 82962 GLUCOSE BLOOD TEST: CPT | Mod: 91

## 2020-10-21 PROCEDURE — 82306 VITAMIN D 25 HYDROXY: CPT

## 2020-10-21 PROCEDURE — 700111 HCHG RX REV CODE 636 W/ 250 OVERRIDE (IP): Performed by: STUDENT IN AN ORGANIZED HEALTH CARE EDUCATION/TRAINING PROGRAM

## 2020-10-21 RX ORDER — PRAMIPEXOLE DIHYDROCHLORIDE 0.5 MG/1
0.5 TABLET ORAL
Status: DISCONTINUED | OUTPATIENT
Start: 2020-10-21 | End: 2020-10-21

## 2020-10-21 RX ORDER — LORAZEPAM 0.5 MG/1
1 TABLET ORAL ONCE
Status: ACTIVE | OUTPATIENT
Start: 2020-10-21 | End: 2020-10-22

## 2020-10-21 RX ORDER — HYDROMORPHONE HYDROCHLORIDE 1 MG/ML
0.5 INJECTION, SOLUTION INTRAMUSCULAR; INTRAVENOUS; SUBCUTANEOUS
Status: DISCONTINUED | OUTPATIENT
Start: 2020-10-21 | End: 2020-10-22 | Stop reason: HOSPADM

## 2020-10-21 RX ORDER — ACETAMINOPHEN 325 MG/1
650 TABLET ORAL EVERY 4 HOURS PRN
Status: DISCONTINUED | OUTPATIENT
Start: 2020-10-21 | End: 2020-10-22 | Stop reason: HOSPADM

## 2020-10-21 RX ORDER — MORPHINE SULFATE 10 MG/ML
5 INJECTION, SOLUTION INTRAMUSCULAR; INTRAVENOUS ONCE
Status: DISCONTINUED | OUTPATIENT
Start: 2020-10-21 | End: 2020-10-21

## 2020-10-21 RX ORDER — PRAMIPEXOLE DIHYDROCHLORIDE 0.5 MG/1
2 TABLET ORAL
Status: DISCONTINUED | OUTPATIENT
Start: 2020-10-21 | End: 2020-10-22 | Stop reason: HOSPADM

## 2020-10-21 RX ADMIN — NICOTINE 14 MG: 14 PATCH TRANSDERMAL at 05:15

## 2020-10-21 RX ADMIN — TIZANIDINE 4 MG: 4 TABLET ORAL at 05:14

## 2020-10-21 RX ADMIN — TIZANIDINE 4 MG: 4 TABLET ORAL at 00:17

## 2020-10-21 RX ADMIN — TIZANIDINE 4 MG: 4 TABLET ORAL at 17:15

## 2020-10-21 RX ADMIN — TIZANIDINE 4 MG: 4 TABLET ORAL at 12:01

## 2020-10-21 RX ADMIN — OXYCODONE HYDROCHLORIDE AND ACETAMINOPHEN 1 TABLET: 5; 325 TABLET ORAL at 12:01

## 2020-10-21 RX ADMIN — OXYCODONE HYDROCHLORIDE AND ACETAMINOPHEN 1 TABLET: 5; 325 TABLET ORAL at 03:20

## 2020-10-21 RX ADMIN — TIZANIDINE 4 MG: 4 TABLET ORAL at 23:45

## 2020-10-21 RX ADMIN — OXYCODONE HYDROCHLORIDE AND ACETAMINOPHEN 1 TABLET: 5; 325 TABLET ORAL at 20:53

## 2020-10-21 RX ADMIN — DOCUSATE SODIUM 50 MG AND SENNOSIDES 8.6 MG 2 TABLET: 8.6; 5 TABLET, FILM COATED ORAL at 05:14

## 2020-10-21 RX ADMIN — HEPARIN SODIUM 5000 UNITS: 5000 INJECTION, SOLUTION INTRAVENOUS; SUBCUTANEOUS at 20:53

## 2020-10-21 RX ADMIN — HEPARIN SODIUM 5000 UNITS: 5000 INJECTION, SOLUTION INTRAVENOUS; SUBCUTANEOUS at 05:14

## 2020-10-21 RX ADMIN — DOCUSATE SODIUM 50 MG AND SENNOSIDES 8.6 MG 2 TABLET: 8.6; 5 TABLET, FILM COATED ORAL at 17:15

## 2020-10-21 RX ADMIN — HEPARIN SODIUM 5000 UNITS: 5000 INJECTION, SOLUTION INTRAVENOUS; SUBCUTANEOUS at 16:00

## 2020-10-21 RX ADMIN — PRAMIPEXOLE DIHYDROCHLORIDE 2 MG: 0.5 TABLET ORAL at 20:52

## 2020-10-21 ASSESSMENT — COGNITIVE AND FUNCTIONAL STATUS - GENERAL
DAILY ACTIVITIY SCORE: 18
TOILETING: A LITTLE
SUGGESTED CMS G CODE MODIFIER DAILY ACTIVITY: CK
CLIMB 3 TO 5 STEPS WITH RAILING: A LITTLE
HELP NEEDED FOR BATHING: A LITTLE
TURNING FROM BACK TO SIDE WHILE IN FLAT BAD: A LOT
DRESSING REGULAR UPPER BODY CLOTHING: A LITTLE
MOVING FROM LYING ON BACK TO SITTING ON SIDE OF FLAT BED: A LOT
EATING MEALS: A LITTLE
SUGGESTED CMS G CODE MODIFIER MOBILITY: CK
DRESSING REGULAR LOWER BODY CLOTHING: A LITTLE
PERSONAL GROOMING: A LITTLE
MOVING TO AND FROM BED TO CHAIR: A LOT
MOBILITY SCORE: 17

## 2020-10-21 ASSESSMENT — ENCOUNTER SYMPTOMS
NECK PAIN: 0
TREMORS: 0
PALPITATIONS: 0
BACK PAIN: 1
DOUBLE VISION: 0
HEMOPTYSIS: 0
FEVER: 0
CONSTIPATION: 0
FOCAL WEAKNESS: 0
VOMITING: 0
MYALGIAS: 0
LOSS OF CONSCIOUSNESS: 0
WHEEZING: 0
SHORTNESS OF BREATH: 0
CLAUDICATION: 0
NAUSEA: 0
TINGLING: 0
SORE THROAT: 0
COUGH: 0
SEIZURES: 0
FALLS: 0
WEIGHT LOSS: 0
SPEECH CHANGE: 0
BLURRED VISION: 0
WEAKNESS: 0
DEPRESSION: 0
HEADACHES: 0
SENSORY CHANGE: 0
DIZZINESS: 0
DIARRHEA: 0
ABDOMINAL PAIN: 0
CHILLS: 0

## 2020-10-21 ASSESSMENT — ACTIVITIES OF DAILY LIVING (ADL): TOILETING: INDEPENDENT

## 2020-10-21 ASSESSMENT — GAIT ASSESSMENTS
ASSISTIVE DEVICE: FRONT WHEEL WALKER
DEVIATION: ANTALGIC;INCREASED BASE OF SUPPORT;DECREASED HEEL STRIKE;DECREASED TOE OFF;OTHER (COMMENT)
DISTANCE (FEET): 30
GAIT LEVEL OF ASSIST: SUPERVISED

## 2020-10-21 ASSESSMENT — PAIN DESCRIPTION - PAIN TYPE
TYPE: ACUTE PAIN

## 2020-10-21 ASSESSMENT — LIFESTYLE VARIABLES: SUBSTANCE_ABUSE: 0

## 2020-10-21 NOTE — PROGRESS NOTES
Patient arrived to T 337.  Alert and oriented X 4. Transferred to Bariatric therapy bed with low air loss mattress.  Pain 4/10. Performed patient assessment (see flow sheets). Pt reports no further needs. Bed locked at the lowest position, call light and personal belongings within reach.

## 2020-10-21 NOTE — ASSESSMENT & PLAN NOTE
-Unclear etiology, spinal compression unlikely due to absence of alarm symptoms  -Possible exercise induced exacerbation of pain secondary to multiple transfers from scooters etc day prior to presentation  Plan  -Pending HH  -Resumed home Tizanidine and Percocet   -PRN bowel regimen  -Bilateral LE US: negative for DVT  Ferritin: normal  Free Thyroxine: normal  Prothrombin time: normal  Sed Rate:normal  B12 level: normal  Vitamin D level: normal  Urine drug screen: Positive for cannabinoids and oxycodone

## 2020-10-21 NOTE — ASSESSMENT & PLAN NOTE
-Current Smoker; ~1/2 pack per day  -Nicotine replacement therapy  -Cessation counseling when appropriate

## 2020-10-21 NOTE — ASSESSMENT & PLAN NOTE
-Per patient, no longer taking her prescribed metformin  -Sliding scale insulin for now, will modify as appropriate in the AM  -Hypoglycemia protocol  -Hemoglobin A1C ordered

## 2020-10-21 NOTE — PROGRESS NOTES
Patient ambulated to the bathroom with 2 person assist and FWW.    Voided 400 mL and medium/large BM.

## 2020-10-21 NOTE — THERAPY
Physical Therapy   Initial Evaluation     Patient Name: Nikki Shaffer  Age:  60 y.o., Sex:  female  Medical Record #: [id number]  Today's Date: 10/21/2020     Precautions: Fall Risk (chronic back pain)    Assessment  After initial evaluation and pt education, pt has no further acute PT needs. Note that pt reports no functional concerns with dc to home once medically cleared, provided she is setup with HH for formal home evaluation and assist with home setup/adaptive equipment at home environment. Appears recent acute on chronic pain was somewhat of culmination of missing medications and faulty home equipment/compensatory strategies which exacerbated her impairments to the point of inability to ambulate. Currently she appears capable of dc to home though she is at high risk for readmission if she does not obtain appropriate services (HH PT/OT) and assist with home setup and appropriate medications at time of dc to home. See below for details/recs.     Plan    Recommend Physical Therapy for Evaluation only for the following treatments:  Bed Mobility, Community Re-integration, Equipment, Gait Training, Manual Therapy, Neuro Re-Education / Balance, Self Care/Home Evaluation, Stair Training, Therapeutic Activities and Therapeutic Exercises    DC Equipment Recommendations: None  Discharge Recommendations: Recommend home health for continued physical therapy services (ASAP for formal home assessement and modifcations)        10/21/20 1535   Prior Living Situation   Prior Services None;Other (Comments)  (was having self pay home PT per pt)   Housing / Facility 1 Story House   Steps Into Home 2  (platform steps; performs with FWW)   Steps In Home 0   Bathroom Set up Bathtub / Shower Combination   Equipment Owned Front-Wheel Walker;Single Point Cane   Lives with - Patient's Self Care Capacity Adult Children   Comments pt reports lives with dtr and dtrs' spouse; however they are of limited assistance; appears has some malfunctioning  equipment as well (specialized bed, recliner, etc)   Prior Level of Functional Mobility   Bed Mobility   (reports was recently sleeping in recliner)   Transfer Status Independent   Ambulation Independent   Distance Ambulation (Feet)   (limited community)   Assistive Devices Used Front-Wheel Walker  (or SPC: recent inc use of FWW>SPC)   Stairs Independent   Comments as above; was mostly I with ADLs and had some assist wtih IADLs; however in part I with ADLs 2' to adpative equipment, of which some is faulty currenlty    Cognition    Cognition / Consciousness WDL   Level of Consciousness Alert   Comments very pleasant and cooperative; motivated; somewhat verbose at times, though anticipate this is baseline behavior 2' to frustrations with obtaining appropriate services   Passive ROM Lower Body   Passive ROM Lower Body WDL   Active ROM Lower Body    Active ROM Lower Body  WDL   Strength Lower Body   Lower Body Strength  X   Comments grossly WFL with formal testing though LLE slightly weaker as compared to RLE   Sensation Lower Body   Lower Extremity Sensation   X   Comments reports diminished sensation at distal LEs (this is baseline per pt)   Strength Upper Body   Upper Body Strength  WDL   Neurological Concerns   Neurological Concerns Yes   Comments given chronic radicular pain and dec motor control; appears currently manageable and improves since admit (pt reports improves with steroid doses)   Balance Assessment   Sitting Balance (Static) Fair +   Sitting Balance (Dynamic) Fair +   Standing Balance (Static) Fair   Standing Balance (Dynamic) Fair   Weight Shift Sitting Good   Weight Shift Standing Fair   Comments no birgit LOB during stnading and ambulation with FWW: able to essentially statically stand without AD; once ambulatory increased reliance on FWW    Gait Analysis   Gait Level Of Assist Supervised   Assistive Device Front Wheel Walker   Distance (Feet) 30   # of Times Distance was Traveled 1   Deviation  "Antalgic;Increased Base Of Support;Decreased Heel Strike;Decreased Toe Off;Other (Comment)  (dec omar; pt reports close to recent baseline gait mechan)   # of Stairs Climbed 0   Weight Bearing Status no restrictions   Comments see balance; dec omar/clearance   Bed Mobility    Supine to Sit Modified Independent   Sit to Supine   (left sitting EOB)   Comments HOB elevated + rails; effortful though performs wihtout physical assist   Functional Mobility   Sit to Stand Modified Independent   Bed, Chair, Wheelchair Transfer Supervised   Transfer Method Stand Step   Mobility wtih FWW   Comments essentially with FWW braced, pt \"walks\" self up FWW   Activity Tolerance   Comments no overt/acute fatigue   Education Group   Role of Physical Therapist Patient Response Patient;Acceptance;Explanation;Verbal Demonstration   Use of Assistive Device Patient Response Patient;Acceptance;Explanation;Demonstration;Teach Back;Verbal Demonstration;Action Demonstration   Additional Comments adjusted height of pt's FWW: education re: compensatory strategies and need for HH assist with setup of home/adaptive equipment and general activity recs;          "
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
## 2020-10-21 NOTE — DIETARY
NUTRITION SERVICES: BMI - Pt with BMI >40 (=Body mass index is 52.15 kg/m².), morbid obesity. Weight is a stated weight. Requested a measured weight. Weight loss counseling not appropriate in acute care setting. RECOMMEND - Referral to outpatient nutrition services for weight management after D/C.

## 2020-10-21 NOTE — ED NOTES
Pt able to ambulated with max assistance from wheelchair to stretcher. Pt in ER stretcher, resting. Feet repositioned for comfort. Waiting for transport to floor.

## 2020-10-21 NOTE — PROGRESS NOTES
· 2 RN skin check complete with Sampson SALAS.  · Devices in place:N/A.   · Skin assessed under devices: N/A  · Confirmed pressure ulcers found on: N/A  · New potential pressure ulcers noted on: N/A. No skin breakdown noted.   · The following interventions in place:  Bariatric therapy bed with low air loss mattress, pillows in use for support/positioning, Q2H turns in effect     Blanchable redness (mild) behind knees and buttocks.
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
## 2020-10-22 VITALS
RESPIRATION RATE: 18 BRPM | BODY MASS INDEX: 44.41 KG/M2 | TEMPERATURE: 97.9 F | DIASTOLIC BLOOD PRESSURE: 61 MMHG | WEIGHT: 293 LBS | HEIGHT: 68 IN | OXYGEN SATURATION: 95 % | HEART RATE: 65 BPM | SYSTOLIC BLOOD PRESSURE: 112 MMHG

## 2020-10-22 PROBLEM — E11.9 DM (DIABETES MELLITUS) (HCC): Status: RESOLVED | Noted: 2020-10-20 | Resolved: 2020-10-22

## 2020-10-22 LAB — GLUCOSE BLD-MCNC: 117 MG/DL (ref 65–99)

## 2020-10-22 PROCEDURE — 700102 HCHG RX REV CODE 250 W/ 637 OVERRIDE(OP): Performed by: STUDENT IN AN ORGANIZED HEALTH CARE EDUCATION/TRAINING PROGRAM

## 2020-10-22 PROCEDURE — 700111 HCHG RX REV CODE 636 W/ 250 OVERRIDE (IP): Performed by: STUDENT IN AN ORGANIZED HEALTH CARE EDUCATION/TRAINING PROGRAM

## 2020-10-22 PROCEDURE — G0378 HOSPITAL OBSERVATION PER HR: HCPCS

## 2020-10-22 PROCEDURE — A9270 NON-COVERED ITEM OR SERVICE: HCPCS | Performed by: STUDENT IN AN ORGANIZED HEALTH CARE EDUCATION/TRAINING PROGRAM

## 2020-10-22 PROCEDURE — 96372 THER/PROPH/DIAG INJ SC/IM: CPT

## 2020-10-22 PROCEDURE — 96375 TX/PRO/DX INJ NEW DRUG ADDON: CPT

## 2020-10-22 PROCEDURE — 82962 GLUCOSE BLOOD TEST: CPT

## 2020-10-22 PROCEDURE — 99217 PR OBSERVATION CARE DISCHARGE: CPT | Mod: GC | Performed by: INTERNAL MEDICINE

## 2020-10-22 RX ORDER — PROCHLORPERAZINE EDISYLATE 5 MG/ML
5 INJECTION INTRAMUSCULAR; INTRAVENOUS EVERY 6 HOURS PRN
Status: DISCONTINUED | OUTPATIENT
Start: 2020-10-22 | End: 2020-10-22 | Stop reason: HOSPADM

## 2020-10-22 RX ADMIN — OXYCODONE HYDROCHLORIDE AND ACETAMINOPHEN 1 TABLET: 5; 325 TABLET ORAL at 12:41

## 2020-10-22 RX ADMIN — HEPARIN SODIUM 5000 UNITS: 5000 INJECTION, SOLUTION INTRAVENOUS; SUBCUTANEOUS at 04:56

## 2020-10-22 RX ADMIN — TIZANIDINE 4 MG: 4 TABLET ORAL at 04:57

## 2020-10-22 RX ADMIN — OXYCODONE HYDROCHLORIDE AND ACETAMINOPHEN 1 TABLET: 5; 325 TABLET ORAL at 08:16

## 2020-10-22 RX ADMIN — TIZANIDINE 4 MG: 4 TABLET ORAL at 11:35

## 2020-10-22 RX ADMIN — PROCHLORPERAZINE EDISYLATE 5 MG: 5 INJECTION INTRAMUSCULAR; INTRAVENOUS at 01:42

## 2020-10-22 RX ADMIN — NICOTINE 14 MG: 14 PATCH TRANSDERMAL at 04:57

## 2020-10-22 RX ADMIN — HEPARIN SODIUM 5000 UNITS: 5000 INJECTION, SOLUTION INTRAVENOUS; SUBCUTANEOUS at 14:55

## 2020-10-22 RX ADMIN — TIZANIDINE 4 MG: 4 TABLET ORAL at 16:14

## 2020-10-22 ASSESSMENT — LIFESTYLE VARIABLES: SUBSTANCE_ABUSE: 0

## 2020-10-22 ASSESSMENT — ENCOUNTER SYMPTOMS
SORE THROAT: 0
SEIZURES: 0
COUGH: 0
FALLS: 0
SENSORY CHANGE: 0
CONSTIPATION: 0
CHILLS: 0
HEMOPTYSIS: 0
SHORTNESS OF BREATH: 0
WEAKNESS: 0
MYALGIAS: 0
TREMORS: 0
DIZZINESS: 0
HEADACHES: 0
LOSS OF CONSCIOUSNESS: 0
DOUBLE VISION: 0
BACK PAIN: 1
VOMITING: 0
ABDOMINAL PAIN: 0
NECK PAIN: 0
WEIGHT LOSS: 0
FEVER: 0
DEPRESSION: 0
TINGLING: 0
FOCAL WEAKNESS: 0
CLAUDICATION: 0
SPEECH CHANGE: 0
PALPITATIONS: 0
BLURRED VISION: 0
DIARRHEA: 0
NAUSEA: 0
WHEEZING: 0

## 2020-10-22 ASSESSMENT — PAIN DESCRIPTION - PAIN TYPE
TYPE: ACUTE PAIN
TYPE: ACUTE PAIN

## 2020-10-22 NOTE — DISCHARGE INSTRUCTIONS
Discharge Instructions    Discharged to home by car with relative. Discharged via wheelchair, hospital escort: Yes.  Special equipment needed: Not Applicable    Be sure to schedule a follow-up appointment with your primary care doctor or any specialists as instructed.     Discharge Plan:   Diet Plan: Discussed  Activity Level: Discussed  Confirmed Follow up Appointment: Patient to Call and Schedule Appointment  Confirmed Symptoms Management: Discussed  Medication Reconciliation Updated: Yes  Influenza Vaccine Indication: Patient Refuses    I understand that a diet low in cholesterol, fat, and sodium is recommended for good health. Unless I have been given specific instructions below for another diet, I accept this instruction as my diet prescription.     Special Instructions:   Follow up with [facility] and your outpatient PT      · Is patient discharged on Warfarin / Coumadin?   No     Depression / Suicide Risk    As you are discharged from this [facility] facility, it is important to learn how to keep safe from harming yourself.    Recognize the warning signs:  · Abrupt changes in personality, positive or negative- including increase in energy   · Giving away possessions  · Change in eating patterns- significant weight changes-  positive or negative  · Change in sleeping patterns- unable to sleep or sleeping all the time   · Unwillingness or inability to communicate  · Depression  · Unusual sadness, discouragement and loneliness  · Talk of wanting to die  · Neglect of personal appearance   · Rebelliousness- reckless behavior  · Withdrawal from people/activities they love  · Confusion- inability to concentrate     If you or a loved one observes any of these behaviors or has concerns about self-harm, here's what you can do:  · Talk about it- your feelings and reasons for harming yourself  · Remove any means that you might use to hurt yourself (examples: pills, rope, extension cords, firearm)  · Get professional  help from the community (Mental Health, Substance Abuse, psychological counseling)  · Do not be alone:Call your Safe Contact- someone whom you trust who will be there for you.  · Call your local CRISIS HOTLINE 217-2150 or 283-243-5802  · Call your local Children's Mobile Crisis Response Team [place] (448) 917-5449 or www.[employer]  · Call the toll free National Suicide Prevention Hotlines   · National Suicide Prevention Lifeline 606-108-PSNT (5381)  · National Hope Line Network 800-SUICIDE (593-9145)
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
## 2020-10-22 NOTE — DISCHARGE PLANNING
Upon further review of patient chart patient does not have a current PCP. Per Epic she did see a PCP June 2020 but PCP is located in [place].  requires a [place] licensed PCP to sign all HH orders. Also the DX code of M54.5,G89.29 (ICD-10-CM) - Acute exacerbation of chronic low back pain is not a valid billable DX code.     Thank you

## 2020-10-22 NOTE — DISCHARGE PLANNING
Spoke to Shani, director of [facility]. She confirms that they are unable to accept for the reasons documented by Anysia. Pt does have PCP at [facility].

## 2020-10-22 NOTE — PROGRESS NOTES
"Patient ambulated to bathroom with RN and CNA. Patient was ambulating back to bed from the bathroom when she reported feeling unlike herself. Patient reports feeling \"nauseous and shaky.\" Patient denied chest pain and shortness of breath. Vital signs obtained by CNA stable. Per patient, a similar episode happened at home prior to coming to the hospital, however, this episode resolved without medication. Notified. Dr. Estrada and received order for Prochlorperazine (Compazine), 5 mg IV, every 6 hours PRN for nausea/vomiting.    "

## 2020-10-22 NOTE — CARE PLAN
Problem: Communication  Goal: The ability to communicate needs accurately and effectively will improve  Outcome: PROGRESSING AS EXPECTED  Intervention: Educate patient and significant other/support system about the plan of care, procedures, treatments, medications and allow for questions  Note: Discussed POC with pt, all questions and concerns were addressed.      Problem: Infection  Goal: Will remain free from infection  Outcome: PROGRESSING AS EXPECTED  Intervention: Assess signs and symptoms of infection  Note: No s/s of infection noted

## 2020-10-22 NOTE — DISCHARGE PLANNING
ATTN: Case Management  RE: Referral for Home Health    Reason for referral denial: At this time we are currently only accepting SCP/HTH referrals.                 Unfortunately, we are not able to accept this referral for the reason listed above. If further clarity is needed, our Transitional Care Specialists are available to discuss any barriers to service at [phone].      We look forward to collaborating with you in the future,  [facility] Team

## 2020-10-22 NOTE — CARE PLAN
Problem: Safety  Goal: Will remain free from injury  Outcome: PROGRESSING AS EXPECTED     Problem: Discharge Barriers/Planning  Goal: Patient's continuum of care needs will be met  Outcome: PROGRESSING AS EXPECTED  Intervention: Collaborate with Transitional Care Team and Interdisciplinary Team to meet discharge needs  Note: Pending home health. CM aware of face to face order.      Problem: Pain Management  Goal: Pain level will decrease to patient's comfort goal  Outcome: PROGRESSING AS EXPECTED  Intervention: Follow pain managment plan developed in collaboration with patient and Interdisciplinary Team  Note: PRN percocet and scheduled tizanidine administered with good effect.

## 2020-10-22 NOTE — DISCHARGE PLANNING
Received Choice form at 0934  [facility] Name: [facility]  Referral sent per Choice form @ [phone]    [facility] Name: [facility]  Spoke To: Lovely  Outcome: Patient declined not contracted with patient's [id number]

## 2020-10-22 NOTE — THERAPY
"Occupational Therapy   Initial Evaluation     Patient Name: Nikki Shaffer  Age:  60 y.o., Sex:  female  Medical Record #: [id number]  Today's Date: 10/21/2020     Precautions: Fall Risk(chronic back pain)  Comments: chronic back pain    Assessment  Patient is 60 y.o. female admitted with progressive weakness and debilitation, inability to stand from chair and chronic back pain in acute exacerbation. Pt presents to OT eval with pain better controlled and improved standing tolerance from admitting description. Pt reports unsupportive household who do not assist her to complete or maintain safety of daily tasks including LB dressing or bathing. Pt reports various safety issues at home including broken equipment and no shower chair. Pt will greatly benefit from home health OT for home safety evaluation and equipment recommendations to increase safety of home environment. No additional acute OT needs at this time.     Plan    Recommend Occupational Therapy for Evaluation only   DC Equipment Recommendations: Bed Side Commode, Tub / Shower Seat(bariatric equipment)  Discharge Recommendations: Recommend home health for continued occupational therapy services     Subjective    \"I really need therapy at home, that will be very helpful.\"        10/21/20 1525   Prior Living Situation   Prior Services None;Home With Outpatient Therapy  (private pay PT )   Housing / Facility 1 Story House   Steps Into Home 2   Bathroom Set up Bathtub / Shower Combination;Shower Glass Doors   Equipment Owned Front-Wheel Walker;Single Point Cane   Lives with - Patient's Self Care Capacity Adult Children;Significant Other   Comments pt lives with daughter, son in law and pts SO, pt reports no one is helpful and her equipment (recliner, adjustable bed) are not functioning properly   Prior Level of ADL Function   Comments independent however pt reports ADLs are difficult   Prior Level of IADL Function   Medication Management Independent   Laundry Requires " Assist   Kitchen Mobility Independent   Finances Independent   Home Management Requires Assist   Shopping Requires Assist   Prior Level Of Mobility Independent With Device in Community;Independent With Device in Home   Precautions   Precautions Fall Risk   Comments chronic back pain   Pain 0 - 10 Group   Therapist Pain Assessment Post Activity Pain Same as Prior to Activity;Nurse Notified  (reports constant pain, controlled at this time)   Cognition    Cognition / Consciousness WDL   Level of Consciousness Alert   Comments verbose, very detailed in her descriptions of barriers to safety at home   Balance Assessment   Weight Shift Sitting Good   Weight Shift Standing Fair   Bed Mobility    Supine to Sit Modified Independent   Sit to Supine   (left EOB with RN's ok)   Scooting Supervised   Rolling Supervised   ADL Assessment   Eating Modified Independent   Grooming Supervision;Seated   Upper Body Dressing Supervision   Lower Body Dressing Supervision  (adjusted sock)   Toileting   (declined needs, reports no deficit in independence)   Functional Mobility   Sit to Stand Modified Independent   Bed, Chair, Wheelchair Transfer Supervised   Toilet Transfers Refused   Transfer Method Stand Step   Mobility w/bariatric FWW   Problem List   Problem List Decreased Active Daily Living Skills;Decreased Homemaking Skills   Anticipated Discharge Equipment and Recommendations   DC Equipment Recommendations Bed Side Commode;Tub / Shower Seat  (bariatric equipment)   Discharge Recommendations Recommend home health for continued occupational therapy services

## 2020-10-22 NOTE — CARE PLAN
Problem: Safety  Goal: Will remain free from falls  Outcome: PROGRESSING AS EXPECTED   Fall precautions in place. Encouraged patient to use call light to alert staff of needs and before getting out of bed, patient verbalized understanding. Call light and personal belongings within reach. Hourly rounding in place.     Problem: Pain Management  Goal: Pain level will decrease to patient's comfort goal  Outcome: PROGRESSING AS EXPECTED   Patient rates pain using 0-10 pain rating scale. Patient medicated for pain per MAR. Pillows in use for support and comfort.     Problem: Mobility  Goal: Risk for activity intolerance will decrease  Outcome: PROGRESSING AS EXPECTED   Patient tolerated ambulation to the bathroom using front wheeled walker for toileting and a shower. Patient tolerated activity well. Steady gait noted with non-skid footwear in use.

## 2020-10-22 NOTE — DISCHARGE PLANNING
Spoke to Kim at [facility], they are contracted with [facility].   [facility] has declined as they are only accepting [facility]. Called [facility]. Awaiting return call from director, Shawn

## 2020-10-23 NOTE — PROGRESS NOTES
AVS reviewed with patient. Follow up appointments discussed. No equipment needed. IV d/c'd. No other questions or concerns expressed. Pt d/c home with family support

## 2021-03-15 DIAGNOSIS — Z23 NEED FOR VACCINATION: ICD-10-CM
